# Patient Record
Sex: MALE | URBAN - METROPOLITAN AREA
[De-identification: names, ages, dates, MRNs, and addresses within clinical notes are randomized per-mention and may not be internally consistent; named-entity substitution may affect disease eponyms.]

---

## 2022-12-20 ENCOUNTER — HOSPITAL ENCOUNTER (INPATIENT)
Age: 77
LOS: 1 days | Discharge: HOME | End: 2022-12-21
Attending: STUDENT IN AN ORGANIZED HEALTH CARE EDUCATION/TRAINING PROGRAM | Admitting: INTERNAL MEDICINE
Payer: MEDICARE

## 2022-12-20 DIAGNOSIS — J84.10 GRANULOMATOUS LUNG DISEASE (HCC): Primary | ICD-10-CM

## 2022-12-20 DIAGNOSIS — J47.9 BRONCHIECTASIS WITHOUT COMPLICATION (HCC): ICD-10-CM

## 2022-12-20 DIAGNOSIS — R91.1 PULMONARY NODULE: ICD-10-CM

## 2022-12-20 PROBLEM — K22.2 ESOPHAGEAL STRICTURE: Status: ACTIVE | Noted: 2022-12-20

## 2022-12-20 LAB — GLUCOSE BLD-MCNC: 131 MG/DL (ref 75–110)

## 2022-12-20 PROCEDURE — 83036 HEMOGLOBIN GLYCOSYLATED A1C: CPT

## 2022-12-20 PROCEDURE — 82947 ASSAY GLUCOSE BLOOD QUANT: CPT

## 2022-12-20 PROCEDURE — 2580000003 HC RX 258: Performed by: INTERNAL MEDICINE

## 2022-12-20 PROCEDURE — C9113 INJ PANTOPRAZOLE SODIUM, VIA: HCPCS | Performed by: INTERNAL MEDICINE

## 2022-12-20 PROCEDURE — 84100 ASSAY OF PHOSPHORUS: CPT

## 2022-12-20 PROCEDURE — 36415 COLL VENOUS BLD VENIPUNCTURE: CPT

## 2022-12-20 PROCEDURE — 83690 ASSAY OF LIPASE: CPT

## 2022-12-20 PROCEDURE — 6360000002 HC RX W HCPCS: Performed by: INTERNAL MEDICINE

## 2022-12-20 PROCEDURE — 80053 COMPREHEN METABOLIC PANEL: CPT

## 2022-12-20 PROCEDURE — 99222 1ST HOSP IP/OBS MODERATE 55: CPT | Performed by: INTERNAL MEDICINE

## 2022-12-20 PROCEDURE — 2060000000 HC ICU INTERMEDIATE R&B

## 2022-12-20 RX ORDER — OMEPRAZOLE 20 MG/1
20 CAPSULE, DELAYED RELEASE ORAL DAILY
Status: ON HOLD | COMMUNITY
End: 2022-12-21 | Stop reason: HOSPADM

## 2022-12-20 RX ORDER — SODIUM CHLORIDE 0.9 % (FLUSH) 0.9 %
5-40 SYRINGE (ML) INJECTION EVERY 12 HOURS SCHEDULED
Status: DISCONTINUED | OUTPATIENT
Start: 2022-12-20 | End: 2022-12-21 | Stop reason: HOSPADM

## 2022-12-20 RX ORDER — ACETAMINOPHEN 650 MG/1
650 SUPPOSITORY RECTAL EVERY 6 HOURS PRN
Status: DISCONTINUED | OUTPATIENT
Start: 2022-12-20 | End: 2022-12-21 | Stop reason: HOSPADM

## 2022-12-20 RX ORDER — INSULIN LISPRO 100 [IU]/ML
0-4 INJECTION, SOLUTION INTRAVENOUS; SUBCUTANEOUS NIGHTLY
Status: DISCONTINUED | OUTPATIENT
Start: 2022-12-20 | End: 2022-12-21 | Stop reason: HOSPADM

## 2022-12-20 RX ORDER — GLYBURIDE 1.25 MG/1
1.25 TABLET ORAL
COMMUNITY

## 2022-12-20 RX ORDER — POTASSIUM CHLORIDE 7.45 MG/ML
10 INJECTION INTRAVENOUS PRN
Status: DISCONTINUED | OUTPATIENT
Start: 2022-12-20 | End: 2022-12-21 | Stop reason: HOSPADM

## 2022-12-20 RX ORDER — DEXTROSE MONOHYDRATE 100 MG/ML
INJECTION, SOLUTION INTRAVENOUS CONTINUOUS PRN
Status: DISCONTINUED | OUTPATIENT
Start: 2022-12-20 | End: 2022-12-21 | Stop reason: HOSPADM

## 2022-12-20 RX ORDER — POLYETHYLENE GLYCOL 3350 17 G/17G
17 POWDER, FOR SOLUTION ORAL DAILY PRN
Status: DISCONTINUED | OUTPATIENT
Start: 2022-12-20 | End: 2022-12-21 | Stop reason: HOSPADM

## 2022-12-20 RX ORDER — DEXTROSE, SODIUM CHLORIDE, AND POTASSIUM CHLORIDE 5; .45; .15 G/100ML; G/100ML; G/100ML
INJECTION INTRAVENOUS CONTINUOUS
Status: DISCONTINUED | OUTPATIENT
Start: 2022-12-20 | End: 2022-12-20

## 2022-12-20 RX ORDER — MAGNESIUM SULFATE 1 G/100ML
1000 INJECTION INTRAVENOUS PRN
Status: DISCONTINUED | OUTPATIENT
Start: 2022-12-20 | End: 2022-12-21 | Stop reason: HOSPADM

## 2022-12-20 RX ORDER — INSULIN LISPRO 100 [IU]/ML
0-4 INJECTION, SOLUTION INTRAVENOUS; SUBCUTANEOUS
Status: DISCONTINUED | OUTPATIENT
Start: 2022-12-21 | End: 2022-12-21 | Stop reason: HOSPADM

## 2022-12-20 RX ORDER — SODIUM CHLORIDE 0.9 % (FLUSH) 0.9 %
10 SYRINGE (ML) INJECTION PRN
Status: DISCONTINUED | OUTPATIENT
Start: 2022-12-20 | End: 2022-12-21 | Stop reason: HOSPADM

## 2022-12-20 RX ORDER — SIMVASTATIN 20 MG
10 TABLET ORAL EVERY OTHER DAY
COMMUNITY

## 2022-12-20 RX ORDER — ONDANSETRON 4 MG/1
4 TABLET, ORALLY DISINTEGRATING ORAL EVERY 8 HOURS PRN
Status: DISCONTINUED | OUTPATIENT
Start: 2022-12-20 | End: 2022-12-21 | Stop reason: HOSPADM

## 2022-12-20 RX ORDER — POTASSIUM CHLORIDE 20 MEQ/1
40 TABLET, EXTENDED RELEASE ORAL PRN
Status: DISCONTINUED | OUTPATIENT
Start: 2022-12-20 | End: 2022-12-21 | Stop reason: HOSPADM

## 2022-12-20 RX ORDER — ONDANSETRON 2 MG/ML
4 INJECTION INTRAMUSCULAR; INTRAVENOUS EVERY 6 HOURS PRN
Status: DISCONTINUED | OUTPATIENT
Start: 2022-12-20 | End: 2022-12-21 | Stop reason: HOSPADM

## 2022-12-20 RX ORDER — SODIUM CHLORIDE 9 MG/ML
INJECTION, SOLUTION INTRAVENOUS PRN
Status: DISCONTINUED | OUTPATIENT
Start: 2022-12-20 | End: 2022-12-21 | Stop reason: HOSPADM

## 2022-12-20 RX ORDER — CLOPIDOGREL BISULFATE 75 MG/1
75 TABLET ORAL EVERY OTHER DAY
COMMUNITY

## 2022-12-20 RX ORDER — ALBUTEROL SULFATE 2.5 MG/3ML
2.5 SOLUTION RESPIRATORY (INHALATION)
Status: DISCONTINUED | OUTPATIENT
Start: 2022-12-20 | End: 2022-12-21 | Stop reason: HOSPADM

## 2022-12-20 RX ORDER — ACETAMINOPHEN 325 MG/1
650 TABLET ORAL EVERY 6 HOURS PRN
Status: DISCONTINUED | OUTPATIENT
Start: 2022-12-20 | End: 2022-12-21 | Stop reason: HOSPADM

## 2022-12-20 RX ORDER — ENOXAPARIN SODIUM 100 MG/ML
40 INJECTION SUBCUTANEOUS DAILY
Status: DISCONTINUED | OUTPATIENT
Start: 2022-12-21 | End: 2022-12-21 | Stop reason: HOSPADM

## 2022-12-20 RX ADMIN — SODIUM CHLORIDE, PRESERVATIVE FREE 40 MG: 5 INJECTION INTRAVENOUS at 23:41

## 2022-12-20 NOTE — MANAGEMENT PLAN
I have not seen this patient personally. Admit orders appear in. Code status not reported in Intake note-->confirm code status [][]  Hasnt been screened for covid, unsure if needed? [][]  Meds, allergy list, pmhx, socvial hx, vitals, labs unknown at this time. Information is as  per notes/EMR        #Esophageal stricture  -Dx as per :  -Imaging/Diagnostics:?  -Patient has plans for surgical intervention as per notes ,surgery unable to perform it at [facility] and being transferred.   -unsure if this is elective vs urgent vs emergent  -Reports inability to swallow solids and liquids as per EMR note  -Hx of esophageal stricture 6 years ago as per EMR note  -Unsure if pre op clearance provided  -Npo, monitor glucose,   -MGT as per surgery      #CAD  -On asa and plavix at home as per EMR  -obtain ekg and tele monitoring  -further review if meds on hold , compliance, hgb, plt, ext.            #DM2  -unable to see home meds or labs in system  -accuchecks, hypoglycemia protocol

## 2022-12-21 ENCOUNTER — APPOINTMENT (OUTPATIENT)
Dept: GENERAL RADIOLOGY | Age: 77
End: 2022-12-21
Attending: STUDENT IN AN ORGANIZED HEALTH CARE EDUCATION/TRAINING PROGRAM
Payer: MEDICARE

## 2022-12-21 ENCOUNTER — ANESTHESIA EVENT (OUTPATIENT)
Dept: OPERATING ROOM | Age: 77
End: 2022-12-21
Payer: MEDICARE

## 2022-12-21 ENCOUNTER — APPOINTMENT (OUTPATIENT)
Dept: CT IMAGING | Age: 77
End: 2022-12-21
Attending: STUDENT IN AN ORGANIZED HEALTH CARE EDUCATION/TRAINING PROGRAM
Payer: MEDICARE

## 2022-12-21 ENCOUNTER — ANESTHESIA (OUTPATIENT)
Dept: OPERATING ROOM | Age: 77
End: 2022-12-21
Payer: MEDICARE

## 2022-12-21 VITALS
HEART RATE: 96 BPM | WEIGHT: 151.46 LBS | OXYGEN SATURATION: 95 % | SYSTOLIC BLOOD PRESSURE: 113 MMHG | RESPIRATION RATE: 26 BRPM | DIASTOLIC BLOOD PRESSURE: 62 MMHG | TEMPERATURE: 97.7 F

## 2022-12-21 PROBLEM — J47.9 BRONCHIECTASIS (HCC): Status: ACTIVE | Noted: 2022-12-21

## 2022-12-21 PROBLEM — I25.10 ATHEROSCLEROSIS OF NATIVE CORONARY ARTERY OF NATIVE HEART WITHOUT ANGINA PECTORIS: Status: ACTIVE | Noted: 2022-12-21

## 2022-12-21 PROBLEM — J84.10 GRANULOMATOUS LUNG DISEASE (HCC): Status: ACTIVE | Noted: 2022-12-21

## 2022-12-21 PROBLEM — E11.9 CONTROLLED TYPE 2 DIABETES MELLITUS WITHOUT COMPLICATION, WITHOUT LONG-TERM CURRENT USE OF INSULIN (HCC): Status: ACTIVE | Noted: 2022-12-21

## 2022-12-21 PROBLEM — D64.9 ANEMIA, NORMOCYTIC NORMOCHROMIC: Status: ACTIVE | Noted: 2022-12-21

## 2022-12-21 PROBLEM — R91.1 PULMONARY NODULE: Status: ACTIVE | Noted: 2022-12-21

## 2022-12-21 PROBLEM — Z86.16 HISTORY OF COVID-19: Status: ACTIVE | Noted: 2022-12-21

## 2022-12-21 LAB
ABO/RH: NORMAL
ABSOLUTE EOS #: 0 K/UL (ref 0–0.44)
ABSOLUTE IMMATURE GRANULOCYTE: 0 K/UL (ref 0–0.3)
ABSOLUTE LYMPH #: 0.52 K/UL (ref 1.1–3.7)
ABSOLUTE MONO #: 0.26 K/UL (ref 0.1–1.2)
ALBUMIN SERPL-MCNC: 3.5 G/DL (ref 3.5–5.2)
ALBUMIN/GLOBULIN RATIO: 1.3 (ref 1–2.5)
ALP BLD-CCNC: 97 U/L (ref 40–129)
ALT SERPL-CCNC: 17 U/L (ref 5–41)
ANION GAP SERPL CALCULATED.3IONS-SCNC: 11 MMOL/L (ref 9–17)
ANTIBODY SCREEN: NEGATIVE
ARM BAND NUMBER: NORMAL
AST SERPL-CCNC: 20 U/L
BASOPHILS # BLD: 0 % (ref 0–2)
BASOPHILS ABSOLUTE: 0 K/UL (ref 0–0.2)
BILIRUB SERPL-MCNC: 0.5 MG/DL (ref 0.3–1.2)
BUN BLDV-MCNC: 14 MG/DL (ref 8–23)
CALCIUM SERPL-MCNC: 8.9 MG/DL (ref 8.6–10.4)
CHLORIDE BLD-SCNC: 107 MMOL/L (ref 98–107)
CO2: 23 MMOL/L (ref 20–31)
CREAT SERPL-MCNC: 0.82 MG/DL (ref 0.7–1.2)
EKG ATRIAL RATE: 98 BPM
EKG P AXIS: 69 DEGREES
EKG P-R INTERVAL: 160 MS
EKG Q-T INTERVAL: 416 MS
EKG QRS DURATION: 138 MS
EKG QTC CALCULATION (BAZETT): 531 MS
EKG R AXIS: -26 DEGREES
EKG T AXIS: 121 DEGREES
EKG VENTRICULAR RATE: 98 BPM
EOSINOPHILS RELATIVE PERCENT: 0 % (ref 1–4)
ESTIMATED AVERAGE GLUCOSE: 192 MG/DL
EXPIRATION DATE: NORMAL
GFR SERPL CREATININE-BSD FRML MDRD: >60 ML/MIN/1.73M2
GLUCOSE BLD-MCNC: 124 MG/DL (ref 75–110)
GLUCOSE BLD-MCNC: 131 MG/DL (ref 75–110)
GLUCOSE BLD-MCNC: 147 MG/DL (ref 70–99)
GLUCOSE BLD-MCNC: 158 MG/DL (ref 75–110)
HBA1C MFR BLD: 8.3 % (ref 4–6)
HCT VFR BLD CALC: 32.5 % (ref 40.7–50.3)
HEMOGLOBIN: 10.8 G/DL (ref 13–17)
IMMATURE GRANULOCYTES: 0 %
LIPASE: 8 U/L (ref 13–60)
LYMPHOCYTES # BLD: 4 % (ref 24–43)
MCH RBC QN AUTO: 32.9 PG (ref 25.2–33.5)
MCHC RBC AUTO-ENTMCNC: 33.2 G/DL (ref 28.4–34.8)
MCV RBC AUTO: 99.1 FL (ref 82.6–102.9)
MONOCYTES # BLD: 2 % (ref 3–12)
MORPHOLOGY: NORMAL
NRBC AUTOMATED: 0 PER 100 WBC
PDW BLD-RTO: 13.4 % (ref 11.8–14.4)
PHOSPHORUS: 1.8 MG/DL (ref 2.5–4.5)
PLATELET # BLD: 421 K/UL (ref 138–453)
PMV BLD AUTO: 10 FL (ref 8.1–13.5)
POTASSIUM SERPL-SCNC: 4.1 MMOL/L (ref 3.7–5.3)
RBC # BLD: 3.28 M/UL (ref 4.21–5.77)
SEG NEUTROPHILS: 94 % (ref 36–65)
SEGMENTED NEUTROPHILS ABSOLUTE COUNT: 12.12 K/UL (ref 1.5–8.1)
SODIUM BLD-SCNC: 141 MMOL/L (ref 135–144)
TOTAL PROTEIN: 6.3 G/DL (ref 6.4–8.3)
TSH SERPL DL<=0.05 MIU/L-ACNC: 1.16 UIU/ML (ref 0.3–5)
WBC # BLD: 12.9 K/UL (ref 3.5–11.3)

## 2022-12-21 PROCEDURE — 86900 BLOOD TYPING SEROLOGIC ABO: CPT

## 2022-12-21 PROCEDURE — 86850 RBC ANTIBODY SCREEN: CPT

## 2022-12-21 PROCEDURE — 2580000003 HC RX 258: Performed by: CLINICAL NURSE SPECIALIST

## 2022-12-21 PROCEDURE — 7100000001 HC PACU RECOVERY - ADDTL 15 MIN: Performed by: INTERNAL MEDICINE

## 2022-12-21 PROCEDURE — 6360000002 HC RX W HCPCS: Performed by: NURSE PRACTITIONER

## 2022-12-21 PROCEDURE — 85025 COMPLETE CBC W/AUTO DIFF WBC: CPT

## 2022-12-21 PROCEDURE — 7100000000 HC PACU RECOVERY - FIRST 15 MIN: Performed by: INTERNAL MEDICINE

## 2022-12-21 PROCEDURE — 84443 ASSAY THYROID STIM HORMONE: CPT

## 2022-12-21 PROCEDURE — 82947 ASSAY GLUCOSE BLOOD QUANT: CPT

## 2022-12-21 PROCEDURE — 93005 ELECTROCARDIOGRAM TRACING: CPT | Performed by: INTERNAL MEDICINE

## 2022-12-21 PROCEDURE — C1726 CATH, BAL DIL, NON-VASCULAR: HCPCS | Performed by: INTERNAL MEDICINE

## 2022-12-21 PROCEDURE — 43249 ESOPH EGD DILATION <30 MM: CPT | Performed by: INTERNAL MEDICINE

## 2022-12-21 PROCEDURE — 94640 AIRWAY INHALATION TREATMENT: CPT

## 2022-12-21 PROCEDURE — 2580000003 HC RX 258: Performed by: INTERNAL MEDICINE

## 2022-12-21 PROCEDURE — 86901 BLOOD TYPING SEROLOGIC RH(D): CPT

## 2022-12-21 PROCEDURE — 36415 COLL VENOUS BLD VENIPUNCTURE: CPT

## 2022-12-21 PROCEDURE — 2500000003 HC RX 250 WO HCPCS: Performed by: NURSE ANESTHETIST, CERTIFIED REGISTERED

## 2022-12-21 PROCEDURE — 6360000002 HC RX W HCPCS: Performed by: NURSE ANESTHETIST, CERTIFIED REGISTERED

## 2022-12-21 PROCEDURE — 3700000000 HC ANESTHESIA ATTENDED CARE: Performed by: INTERNAL MEDICINE

## 2022-12-21 PROCEDURE — 2709999900 HC NON-CHARGEABLE SUPPLY: Performed by: INTERNAL MEDICINE

## 2022-12-21 PROCEDURE — 0D748ZZ DILATION OF ESOPHAGOGASTRIC JUNCTION, VIA NATURAL OR ARTIFICIAL OPENING ENDOSCOPIC: ICD-10-PCS | Performed by: INTERNAL MEDICINE

## 2022-12-21 PROCEDURE — 71045 X-RAY EXAM CHEST 1 VIEW: CPT

## 2022-12-21 PROCEDURE — 93010 ELECTROCARDIOGRAM REPORT: CPT | Performed by: INTERNAL MEDICINE

## 2022-12-21 PROCEDURE — 99238 HOSP IP/OBS DSCHRG MGMT 30/<: CPT | Performed by: INTERNAL MEDICINE

## 2022-12-21 PROCEDURE — 3700000001 HC ADD 15 MINUTES (ANESTHESIA): Performed by: INTERNAL MEDICINE

## 2022-12-21 PROCEDURE — 3609017700 HC EGD DILATION GASTRIC/DUODENAL STRICTURE: Performed by: INTERNAL MEDICINE

## 2022-12-21 PROCEDURE — 71250 CT THORAX DX C-: CPT

## 2022-12-21 RX ORDER — PANTOPRAZOLE SODIUM 40 MG/1
40 TABLET, DELAYED RELEASE ORAL
Qty: 30 TABLET | Refills: 5 | Status: SHIPPED | OUTPATIENT
Start: 2022-12-21

## 2022-12-21 RX ORDER — PROPOFOL 10 MG/ML
INJECTION, EMULSION INTRAVENOUS PRN
Status: DISCONTINUED | OUTPATIENT
Start: 2022-12-21 | End: 2022-12-21 | Stop reason: SDUPTHER

## 2022-12-21 RX ORDER — SODIUM CHLORIDE 9 MG/ML
INJECTION, SOLUTION INTRAVENOUS CONTINUOUS
Status: DISCONTINUED | OUTPATIENT
Start: 2022-12-21 | End: 2022-12-21

## 2022-12-21 RX ORDER — HYDRALAZINE HYDROCHLORIDE 20 MG/ML
10 INJECTION INTRAMUSCULAR; INTRAVENOUS
Status: DISCONTINUED | OUTPATIENT
Start: 2022-12-21 | End: 2022-12-21 | Stop reason: HOSPADM

## 2022-12-21 RX ORDER — AZITHROMYCIN 250 MG/1
250 TABLET, FILM COATED ORAL SEE ADMIN INSTRUCTIONS
Qty: 6 TABLET | Refills: 0 | Status: SHIPPED | OUTPATIENT
Start: 2022-12-21 | End: 2022-12-26

## 2022-12-21 RX ORDER — SIMVASTATIN 10 MG
10 TABLET ORAL EVERY OTHER DAY
Status: DISCONTINUED | OUTPATIENT
Start: 2022-12-23 | End: 2022-12-21 | Stop reason: HOSPADM

## 2022-12-21 RX ORDER — LIDOCAINE HYDROCHLORIDE 10 MG/ML
INJECTION, SOLUTION EPIDURAL; INFILTRATION; INTRACAUDAL; PERINEURAL PRN
Status: DISCONTINUED | OUTPATIENT
Start: 2022-12-21 | End: 2022-12-21 | Stop reason: SDUPTHER

## 2022-12-21 RX ORDER — SODIUM CHLORIDE 9 MG/ML
INJECTION, SOLUTION INTRAVENOUS PRN
Status: DISCONTINUED | OUTPATIENT
Start: 2022-12-21 | End: 2022-12-21 | Stop reason: HOSPADM

## 2022-12-21 RX ORDER — SODIUM CHLORIDE 0.9 % (FLUSH) 0.9 %
5-40 SYRINGE (ML) INJECTION EVERY 12 HOURS SCHEDULED
Status: DISCONTINUED | OUTPATIENT
Start: 2022-12-21 | End: 2022-12-21 | Stop reason: HOSPADM

## 2022-12-21 RX ORDER — SODIUM CHLORIDE 0.9 % (FLUSH) 0.9 %
5-40 SYRINGE (ML) INJECTION PRN
Status: DISCONTINUED | OUTPATIENT
Start: 2022-12-21 | End: 2022-12-21 | Stop reason: HOSPADM

## 2022-12-21 RX ORDER — SODIUM CHLORIDE 9 MG/ML
INJECTION, SOLUTION INTRAVENOUS CONTINUOUS
Status: DISCONTINUED | OUTPATIENT
Start: 2022-12-21 | End: 2022-12-21 | Stop reason: HOSPADM

## 2022-12-21 RX ORDER — CLOPIDOGREL BISULFATE 75 MG/1
75 TABLET ORAL EVERY OTHER DAY
Status: DISCONTINUED | OUTPATIENT
Start: 2022-12-23 | End: 2022-12-21 | Stop reason: HOSPADM

## 2022-12-21 RX ORDER — ONDANSETRON 2 MG/ML
4 INJECTION INTRAMUSCULAR; INTRAVENOUS
Status: DISCONTINUED | OUTPATIENT
Start: 2022-12-21 | End: 2022-12-21 | Stop reason: HOSPADM

## 2022-12-21 RX ADMIN — ALBUTEROL SULFATE 2.5 MG: 2.5 SOLUTION RESPIRATORY (INHALATION) at 01:56

## 2022-12-21 RX ADMIN — SODIUM CHLORIDE: 9 INJECTION, SOLUTION INTRAVENOUS at 10:50

## 2022-12-21 RX ADMIN — LIDOCAINE HYDROCHLORIDE 50 MG: 10 INJECTION, SOLUTION EPIDURAL; INFILTRATION; INTRACAUDAL; PERINEURAL at 11:12

## 2022-12-21 RX ADMIN — ENOXAPARIN SODIUM 40 MG: 100 INJECTION SUBCUTANEOUS at 08:22

## 2022-12-21 RX ADMIN — SODIUM CHLORIDE: 9 INJECTION, SOLUTION INTRAVENOUS at 00:51

## 2022-12-21 RX ADMIN — PROPOFOL 100 MG: 10 INJECTION, EMULSION INTRAVENOUS at 11:12

## 2022-12-21 RX ADMIN — SODIUM CHLORIDE, PRESERVATIVE FREE 10 ML: 5 INJECTION INTRAVENOUS at 13:01

## 2022-12-21 ASSESSMENT — ENCOUNTER SYMPTOMS
NAUSEA: 0
ABDOMINAL PAIN: 0
TROUBLE SWALLOWING: 1
COUGH: 1
SHORTNESS OF BREATH: 1
SORE THROAT: 0
VOMITING: 0

## 2022-12-21 NOTE — PROGRESS NOTES
CLINICAL PHARMACY NOTE: MEDS TO BEDS    Total # of Prescriptions Filled: 2   The following medications were delivered to the patient:  PROTONIX 40   AZITHROMYCIN 250     Additional Documentation:

## 2022-12-21 NOTE — CONSULTS
THE MEDICAL [place] [facility]  Consultation Note     . REASON FOR CONSULTATION: Progressive dysphagia      HISTORY OF PRESENT ILLNESS:     This is a 76 y.o. male who presents with dysphagia. Patient has past medical history of esophageal stricture s/p dilation 6 years ago, chronic GERD, MICHAEL on CPAP. History was taken from the patient and his wife. According to them, patient is complaining of dysphagia started around 6 years ago when he noticed that his food stuck in his chest whenever he eats. During that time, he underwent upper EGD when stricture was found and esophageal dilatation was done after which his symptoms got resolved. Since then, he has been having minimal dysphagia with solid foods intermittently but usually waits till his food goes down. According to the wife, he only wants food which is juicy/puréed in consistency so that he can digest the food easily. The patient's complaints got worse and on Monday when he was not able to eat a meal prepared by his wife. There was minimal dysphagia to liquids as well on Monday although the patient reports that now he can drink liquids comfortably. The patient was then taken to [facility] by his wife due to concerns of recurrence of previous problem. According to them, CT scan was done at the [facility] and there were concerns for stricture formation when the patient was transferred to [facility] for further GI evaluation. The patient denies any coughing episode after he eats food. There is no history of burning chest pain associated with dysphagia. Chest x-ray was done which showed chronic interstitial lung disease possibility. During bedside evaluation, the patient was awake, alert, oriented X1. He was under no acute distress. Patient was hemodynamically stable with blood pressure 124/68 mmHg and MAP of 85. He was breathing comfortably with 2 L nasal cannula oxygen.   Patient denied any headache, chest pain, abdominal pain, vomiting, nausea. He did report having shortness of breath and cough but according to the wife, it has been happening post COVID last year. Patient denied any cough episode after eating food or any episode of aspiration recently. He did not report any burning chest pain. Cardiovascular and respiratory examinations were within normal limits. The patient was being prepared to undergo CT chest without contrast for evaluation of possible stricture. Previous GI history: Esophageal stricture s/p EGD dilation 6 years ago. On PPI. PAST MEDICAL HISTORY:  No past medical history on file. No past surgical history on file. ALLERGIES:  No Known Allergies    HOME MEDICATIONS:  Prior to Admission medications    Medication Sig Start Date End Date Taking? Authorizing Provider   clopidogrel (PLAVIX) 75 MG tablet Take 75 mg by mouth every other day Half tab   Yes Historical Provider, MD   simvastatin (ZOCOR) 20 MG tablet Take 10 mg by mouth every other day   Yes Historical Provider, MD   glyBURIDE (DIABETA) 1.25 MG tablet Take 1.25 mg by mouth daily (with breakfast)   Yes Historical Provider, MD   omeprazole (PRILOSEC) 20 MG delayed release capsule Take 20 mg by mouth daily   Yes Historical Provider, MD     .  CURRENT MEDICATIONS:  Scheduled Meds:   [START ON 12/23/2022] simvastatin  10 mg Oral Every Other Day    [START ON 12/23/2022] clopidogrel  75 mg Oral Every Other Day    sodium chloride flush  5-40 mL IntraVENous 2 times per day    enoxaparin  40 mg SubCUTAneous Daily    insulin lispro  0-4 Units SubCUTAneous TID WC    insulin lispro  0-4 Units SubCUTAneous Nightly    pantoprazole (PROTONIX) 40 mg injection  40 mg IntraVENous Q12H     . Continuous Infusions:   [Held by provider] sodium chloride Stopped (12/21/22 0115)    sodium chloride      dextrose       .   PRN Meds:sodium chloride flush, sodium chloride, potassium chloride **OR** potassium alternative oral replacement **OR** potassium chloride, magnesium sulfate, ondansetron **OR** ondansetron, acetaminophen **OR** acetaminophen, polyethylene glycol, glucose, dextrose bolus **OR** dextrose bolus, glucagon (rDNA), dextrose, albuterol  . SOCIAL HISTORY:     Social History     Socioeconomic History    Marital status: Unknown     Spouse name: Not on file    Number of children: Not on file    Years of education: Not on file    Highest education level: Not on file   Occupational History    Not on file   Tobacco Use    Smoking status: Former     Types: Cigarettes     Quit date: 2012     Years since quitting: 10.9    Smokeless tobacco: Not on file   Substance and Sexual Activity    Alcohol use: Not on file    Drug use: Not on file    Sexual activity: Not on file   Other Topics Concern    Not on file   Social History Narrative    Not on file     Social Determinants of Health     Financial Resource Strain: Not on file   Food Insecurity: Not on file   Transportation Needs: Not on file   Physical Activity: Not on file   Stress: Not on file   Social Connections: Not on file   Intimate Partner Violence: Not on file   Housing Stability: Not on file       FAMILY HISTORY:   No family history on file. REVIEW OF SYSTEMS:     Constitutional: No fever, no chills, no lethargy, no weakness. HEENT:  No headache, otalgia, itchy eyes, nasal discharge or sore throat. Cardiac:  No chest pain, dyspnea, orthopnea or PND. Chest:   No cough, phlegm or wheezing. Abdomen:  No abdominal pain, nausea or vomiting. Patient has complaints of solid food dysphagia and difficulty bending down food. Neuro:  No focal weakness, abnormal movements or seizure like activity. Skin:   No rashes, no itching. :   No hematuria, no pyuria, no dysuria, no flank pain. Extremities:  No swelling or joint pains. ROS was otherwise negative except as mentioned in the [address]      PHYSICAL EXAM:    /60   Pulse 95   Temp 98.1 °F (36.7 °C) (Oral)   Resp 18   Wt 151 lb 7.3 oz (68.7 kg)   SpO2 94%   . TMAX[24]    General: Well developed, Well nourished, No apparent distress  Head:  Normocephalic, Atraumatic  EENT: EOMI, Sclera not icteric, Oropharynx moist  Neck:  Supple, Trachea midline  Lungs:CTA Bilaterally  Heart: RRR, No murmur, No rub, No gallop, PMI nondisplaced. Abdomen:Soft, Non tender, Not distended, BS WNL,  No masses. Ext:No clubbing. No cyanosis. No edema. Skin: No rashes. No jaundice. No stigmata of liver disease. Neuro:  A&O x Three, No focal neurological deficits    LABS and IMAGING:     Hemotological labs:   ANEMIA STUDIES  No results for input(s): LABIRON, TIBC, FERRITIN, [id number], FOLATE, OCCULTBLD in the last 72 hours. CBC  Recent Labs     12/21/22  0213   WBC 12.9*   HGB 10.8*   MCV 99.1   RDW 13.4          PT/INR  No results for input(s): PROTIME, INR in the last 72 hours. BMP  Recent Labs     12/20/22  2334      K 4.1      CO2 23   BUN 14   CREATININE 0.82   GLUCOSE 147*   CALCIUM 8.9       LIVER WORK UP  Hepatitis Functional Panel:  Recent Labs     12/20/22  2334   ALKPHOS 97   ALT 17   AST 20   PROT 6.3*   BILITOT 0.5   LABALBU 3.5       AMYLASE/LIPASE/AMMONIA  Recent Labs     12/20/22  2334   LIPASE 8*       Acute Hepatitis Panel   No results found for: HEPBSAG, HEPCAB, HEPBIGM, HEPAIGM    HCV Genotype   No results found for: HEPATITISCGENOTYPE    HCV Quantitative   No results found for: HCVQNT    Metabolic work up:  Ceruloplasmin  AA work up:  Alpha 1 antitrypsin   No results found for: A1A  AMA:  No results found for: MITOAB    ASMA:  No results found for: SMOOTHMUSCAB    ANCA:    ARTEMIO:  No results found for: ARTEMIO    Anti - Liver/Kidney Ab:  No results found for: LIVER-KIDNEYMICROSOMALAB    Ceruloplasmin:  No results found for: CERULOPLSM    Celiac panel:  No results found for: TISSTRNTIIGG, TTGIGA, IGA    Cancer Markers:  CEA:    No results for input(s): CEA in the last 72 hours.   Ca 125:   No results for input(s):  in the last 72 hours. Ca 19-9:     Invalid input(s):   AFP: No results for input(s): AFP in the last 72 hours. ASSESSMENT and PLAN:     Esophageal dysphagia secondary to possible esophageal stricture formation:  -Patient complains of solid food dysphagia intermittently since 6 years  -S/p EGD and dilation of esophageal stricture 6 years ago  -History of chronic GERD with inconsistent PPI use  -Increased symptoms since Monday  -CT chest without contrast ordered for further evaluation  -Will possibly need upper EGD dilation to relieve symptoms    History of chronic GERD:  -Inconsistent PPI use  -Patient counseled by primary for consistent PPI use  -Protonix 40 mg IV every 12 hours started  -Continue current management    History of CAD:  -On Plavix at home  -Did not take any home medications since 2 days given n.p.o. status  -Resume Plavix after EGD  -Management per primary    History of diabetes mellitus type 2:  -Management as per primary    MICHAEL on CPAP:  -Management as per primary    This plan was formulated in collaboration with Dr. Apolinar Pascual. Awaiting attending attestation. Electronically signed by:  Laura Perez MD  Internal Medicine Resident, PGY-1  [facility];  [place], [place]  12/21/2022,9:31 AM

## 2022-12-21 NOTE — DISCHARGE SUMMARY
[facility]  Office: [address], DO, [place], DO, Carmelo Atkinson, DO, Leonel Romel Blood, DO, Eugene Stafford MD, Stephanie Jennings MD, Guadalupe Patterson MD, Luke Holloway MD,  Alexei Rodriguez MD, Brunilda Lisa MD, Nemesio Herrera, DO, Key Stout MD,  Fiona Tavarez MD, Urszula Salas MD, Tori Moritz, DO, Aiden Bullock MD, Jenifer Mirza MD, Cecil Buerger, DO, Vinayak Barraza MD, Alecia Bernabe MD, Jax Raymond MD, Martinez Ramires MD, Luisito So, DO, Tito Faith MD, Mary Roberto MD, Rumalda Snellen, Sarina Scott, CNP, Zain Frances, CNP, Valery Garcia, CNP,  Carolina Newsome, DNP, Vicky Youngblood, CNP, Lynnette Uriarte, CNP, Yudy Jenkins, CNP, Jennifer Suero, CNP, Godwin Tay, CNP, Wayne Paz PA-C, Jaime Nichole, CNS, Kaya Lujan, CNP, Lenore Buchanan, CNP           [facility]    Discharge Summary    Patient ID: Lyly Blackburn  :  1945   MRN: [id number]     ACCOUNT:  [de-identified]   Patient's PCP: Brian Holden DO  Admit Date: 2022   Discharge Date: 2022    Discharge Physician: Aaron Guerra DO     The patient was seen and examined on day of discharge and this discharge summary is in conjunction with any daily progress note from day of discharge.     Active Discharge Diagnoses:     Primary Problem  Esophageal stricture      Hospital Problems  Active Hospital Problems    Diagnosis Date Noted    Bronchiectasis [facility]) [J47.9] 2022     Priority: Medium    Pulmonary nodule right bronchus intermedius [R91.1] 2022     Priority: Medium    Granulomatous lung disease ([facility] [address] [J84.10] 2022     Priority: Medium    History of COVID-19 [Z86.16] 2022     Priority: Medium Anemia, normocytic normochromic [D64.9] 12/21/2022     Priority: Medium    Controlled type 2 diabetes mellitus without complication, without long-term current use of insulin ([facility] [address] [E11.9] 12/21/2022     Priority: Medium    Coronary artery of native heart without angina pectoris [I25.10] 12/21/2022     Priority: Medium    Esophageal stricture [K22.2] 12/20/2022     Priority: Medium         Hospital Course:     Brief History  As documented in the medical record:  \"Kingsley Hinojosa is a 76 y.o.  ramirez with Hx of esophageal stricture s/p dilitation 6 years ago, chronic gerd with intermittent ppi compliance, nighly 02 dependence post covid infection last year, sushma on cpap, cad on plavix, HLD  who presents with progressive dysphagia over past few months. Reports feeling like food stuck in his chest. Intermittent coughing after eating. Recently noticed symptoms with liquid. Denies aspiration, Sob, pleuritic cp, angina symptoms, fever chills, diarrhea or constipation, Nausea or vomit,  leg pain or swelling, pnd or orthopnea. Patient became concerned as wife wanted him checked out, he went to outside facility who performed a CT scan and did a bedside eval and reported concern for stricture formation. Sent here for GI specialty services and further eval. \"        The intitial assessment and plan included:  \"  #Dysphagia  #Esophageal stricture  -Patient reports having a nasopharyngeal laryngoscopy done at [facility]  -Imaging/Diagnostics:?  -Patient has plans for surgical intervention as per notes ,surgery unable to perform it at [facility] facility and being transferred. -Reports inability to swallow solids and liquids without pain. Started as Solids and now liquids intermittently. No neuro deficits.   -Hx of esophageal stricture 6 years s/p dilatation  which helped significantly  -CT scan at [facility]:  -Npo, hold IVF concern for over hydration as hes been on continuous fluids for 2 days.   -currently hasnt taken his plavix or home meds in 2 days, will resume after egd  -MGT as per gi, consider egd for dx and therapeutic vs barium study     #GERD  -chronic heart burn he reports, reduces to take ppi daily. Says he only takes it when he thinks he really needs it.   -counseled on taking it as directed. While inpatient will administer Iv BID  -as per GI     #CAD  -stable no chest pain,   -On plavix at home , intermittent statin use   -obtain ekg and tele monitoring  -on plavix at home, no aspirin reported--has been off for 2 days given npo status  -hgb, plt at baseline, euvolemic, resume plavix after GI eval     #DM2  -on oral agent at home, overall controlled, denies neuropathy, has been NPO for 2 days.   -sliding scale, glucose goal 140-180 while in patient   -accuchecks, hypoglycemia protocol     #Nocturnal o2  -denies any copd, asthma hx, reports covid infection last year and DC home with o2, weaned down tro nocturnal 02. Intermittent use. Denies SOB, pleuritic cp. Reports prn albuterol and nocturnal 02 recs by his doc, they are hoping to wean off nocturnal o2 in near future  -resume NC at night     VTE ppx: on lovenox 40mg subQ            \"      The patient reports a chronic cough since COVID last year     CT scam revealed:  Perihilar predominant pleural-parenchymal abnormalities, including evidence   of prior granulomatous disease, mild cylindrical/traction bronchiectasis,   ground-glass/interstitial opacities, scattered mucus plugging, and   bronchitis/bronchiolitis. Right bronchus intermedius mural based nodule, and   some bronchial narrowing on the left/KALEB confluent opacity. Differential   includes prior infectious granulomatous diseases, granulomatous reactions to   occupational exposures, atypical sarcoidosis, other infections, connective   tissue diseases and vasculitides. Additional findings, as above. RECOMMENDATIONS:   Consider bronchoscopy. GI was consulted  EGD revealed:  Findings:    The GE junction was noted at 38 cm from the incisors. A partially obstructive Schatzki ring measuring approximately 12 to 13 mm in diameter was noted at the GE junction. A TTS -CRE balloon measuring 12 to 15 mm and then 15 to 18 mm were inserted. Sequential dilations were performed up to 16.5 mm. Post dilation inspection revealed a superficial mucosal tear at the Schatzki ring. A 3-4 cm hiatal hernia was noted without evidence of Hira lesions. Remainder of the stomach appeared normal on forward and retroflexed views. The examined duodenum appeared normal.   Recommendations:  Continue high-dose PPI such as pantoprazole or omeprazole 40 mg once daily for 8 weeks. Follow an antireflux lifestyle and GERD prevention diet. Assess response to today's dilation. If patient continues to have recurrence of symptoms, will need EGD with repeat dilation in about 3 to 4 weeks. Okay to discharge home from GI standpoint after tolerating soft diet. Okay to resume Plavix tomorrow. Patient to follow-up in GI clinic in 3-4 weeks. GI will sign off. Please call for any questions or concerns. The patient's condition was stabilized  Discharge planning initiated       Discharge plan:     Status post EGD and successful esophageal dilation  Reflux precautions  Protonix  GI eval & f/u as scheduled  Outpatient pulmonary consultation for abnormal CT  Suggest outpatient bronchoscopy  Trial of Zithromax  DVT prophylaxis  The patient's status and plan have been discussed with the patient and wife at the bedside   They are anxious for discharge  Will discharge when arrangements complete and ok with other services.   Follow-up with PCP in one week, Kathrine Mcneal DO  Notify PCP of discharge     Discharge Procedure [facility], [place], [place], Pulmonology, [place]   Referral Priority: Routine Referral Type: Eval and Treat   Referral Reason: Specialty Services Required   Referred to Provider: Hussein Nascimento Requested Specialty: Pulmonology   Number of Visits Requested: 1       Significant Diagnostic Studies:     Labs / Micro:  Labs:  Hematology:  Recent Labs     12/21/22  0213   WBC 12.9*   RBC 3.28*   HGB 10.8*   HCT 32.5*   MCV 99.1   MCH 32.9   MCHC 33.2   RDW 13.4      MPV 10.0     Chemistry:  Recent Labs     12/20/22  2334      K 4.1      CO2 23   GLUCOSE 147*   BUN 14   CREATININE 0.82   ANIONGAP 11   LABGLOM >60   CALCIUM 8.9   PHOS 1.8*     Recent Labs     12/20/22  2334 12/20/22  2339 12/21/22  0430 12/21/22  0719 12/21/22  0800 12/21/22  1355   PROT 6.3*  --   --   --   --   --    LABALBU 3.5  --   --   --   --   --    LABA1C 8.3*  --   --   --   --   --    TSH  --   --   --   --  1.16  --    AST 20  --   --   --   --   --    ALT 17  --   --   --   --   --    ALKPHOS 97  --   --   --   --   --    BILITOT 0.5  --   --   --   --   --    LIPASE 8*  --   --   --   --   --    POCGLU  --  131* 131* 158*  --  124*         Radiology:    CT CHEST WO CONTRAST    Result Date: 12/21/2022  EXAMINATION: CT OF THE CHEST WITHOUT CONTRAST 12/21/2022 8:28 am TECHNIQUE: CT of the chest was performed without the administration of intravenous contrast. Multiplanar reformatted images are provided for review. Automated exposure control, iterative reconstruction, and/or weight based adjustment of the mA/kV was utilized to reduce the radiation dose to as low as reasonably achievable. COMPARISON: Chest x-ray from the same day. HISTORY: ORDERING SYSTEM PROVIDED HISTORY: pre-op; poss interstitial lung disease TECHNOLOGIST PROVIDED HISTORY: Pre-op; poss interstitial lung disease FINDINGS: Mediastinum: Cardiac chambers WNL in size/configuration; moderate 3-vessel coronary artery calcifications. Small mostly anterior pericardial effusion. Main PA caliber WNL. No acute abnormality thoracic aorta. Heavily calcified subcarinal/left hilar nodes; no pathologically enlarged nodes. Esophageal mural prominence. Unremarkable thyroid. Lungs/pleura: Anaya/suprahilar predominant linear and ground-glass opacities extending to the pleural surface with mild cylindrical/traction bronchiectasis and mild bronchial wall thickening; mild pleural thickening also noted bilaterally. Localized KALEB confluent density identified, along the major fissure with associated traction bronchiectasis and some bronchial narrowing. Scattered irregular peripheral and pleural based densities, best seen RLL near the diaphragm with a somewhat tree-in-bud pattern. Some mucus or other debris within bronchi noted, best appreciated RLL (slice 55, series 4). No consolidation, sizable pleural effusion, pneumothorax or pulmonary edema. Tiny tracheal diverticulum superiorly on the right 4 mm bronchial nodule bronchus intermedius distally. Upper Abdomen: Multiple hepatic and splenic calcifications. Surgical absence of gallbladder. Somewhat heterogeneous hepatic attenuation. Colonic diverticula with some residual high density within large bowel. Small hiatus hernia. Soft Tissues/Bones: Mild kyphoscoliosis, osteopenia and DJD thoracic spine. No acute bony or soft tissue abnormality. Perihilar predominant pleural-parenchymal abnormalities, including evidence of prior granulomatous disease, mild cylindrical/traction bronchiectasis, ground-glass/interstitial opacities, scattered mucus plugging, and bronchitis/bronchiolitis. Right bronchus intermedius mural based endoluminal nodule (or mucous), left bronchial narrowing and KALEB confluent opacity. Differential includes prior infectious granulomatous diseases, granulomatous reactions to occupational exposures, atypical sarcoidosis, other infections, connective tissue diseases, vasculitides and other causes. Additional findings, as above. RECOMMENDATIONS: Consider bronchoscopy for further assessment and diagnosis.      XR CHEST PORTABLE    Result Date: 12/21/2022  EXAMINATION: ONE XRAY VIEW OF THE CHEST 12/21/2022 3:34 am COMPARISON: None. HISTORY: ORDERING SYSTEM PROVIDED HISTORY: pre op TECHNOLOGIST PROVIDED HISTORY: pre op Reason for Exam: upr,pre op,hx esoph stricture FINDINGS: Heart size is normal.  Pulmonary vasculature appears normal.  There is diffuse interstitial prominence and tenting of the left hemidiaphragm. There may be mild airspace opacity at the left suprahilar region. No pneumothorax or pleural effusion. Surrounding structures are unremarkable. Possible chronic interstitial lung disease. Follow-up noncontrast chest CT recommended for further evaluation preop.          Consultations:    Consults:     Final Specialist Recommendations/Findings:   IP CONSULT TO GI        Discharged Condition:    Stable     Disposition: Home    Physician Follow Up:   Noemí Rosales MD  [address]  [phone]    Schedule an appointment as soon as possible for a visit         Activity:  activity as tolerated    Diet:  diet as tolerated      Discharge Medications:      Medication List        START taking these medications      azithromycin 250 MG tablet  Commonly known as: ZITHROMAX  Take 1 tablet by mouth See Admin Instructions for 5 days 500mg on day 1 followed by 250mg on days 2 - 5     pantoprazole 40 MG tablet  Commonly known as: PROTONIX  Take 1 tablet by mouth every morning (before breakfast)            CONTINUE taking these medications      clopidogrel 75 MG tablet  Commonly known as: PLAVIX     glyBURIDE 1.25 MG tablet  Commonly known as: DIABETA     simvastatin 20 MG tablet  Commonly known as: ZOCOR            STOP taking these medications      omeprazole 20 MG delayed release capsule  Commonly known as: PRILOSEC               Where to Get Your Medications        These medications were sent to [facility] [address] [facility], [address], [facility] [address]      Phone: [phone]   azithromycin 250 MG tablet  pantoprazole 40 MG tablet Time Spent on discharge is  20 mins in patient examination, evaluation, counseling, medication reconciliation, discharge plan and follow up. Electronically signed by   Shahid Beck DO  12/21/2022  6:26 PM      Thank you Dr. Demetri Penn DO for the opportunity to be involved in this patient's care.

## 2022-12-21 NOTE — ANESTHESIA PRE PROCEDURE
Department of Anesthesiology  Preprocedure Note       Name:  Salvatore Sotomayor   Age:  76 y.o.  :  1945                                          MRN:  [id number]         Date:  2022      Surgeon: Flako Garcia):  Nai Lainez MD    Procedure: Procedure(s):  EGD ESOPHAGOGASTRODUODENOSCOPY    Medications prior to admission:   Prior to Admission medications    Medication Sig Start Date End Date Taking?  Authorizing Provider   clopidogrel (PLAVIX) 75 MG tablet Take 75 mg by mouth every other day Half tab   Yes Historical Provider, MD   simvastatin (ZOCOR) 20 MG tablet Take 10 mg by mouth every other day   Yes Historical Provider, MD   glyBURIDE (DIABETA) 1.25 MG tablet Take 1.25 mg by mouth daily (with breakfast)   Yes Historical Provider, MD   omeprazole (PRILOSEC) 20 MG delayed release capsule Take 20 mg by mouth daily   Yes Historical Provider, MD       Current medications:    Current Facility-Administered Medications   Medication Dose Route Frequency Provider Last Rate Last Admin    [Held by provider] 0.9 % sodium chloride infusion   IntraVENous Continuous NANCY Schaefer - CNS   Stopped at 22 0115    [START ON 2022] simvastatin (ZOCOR) tablet 10 mg  10 mg Oral Every Other Day Evan Sandoval MD        [START ON 2022] clopidogrel (PLAVIX) tablet 75 mg  75 mg Oral Every Other Day Evan Sandoval MD        sodium chloride flush 0.9 % injection 5-40 mL  5-40 mL IntraVENous 2 times per day NANCY Middleton - CNP        sodium chloride flush 0.9 % injection 10 mL  10 mL IntraVENous PRN NANCY Middleton - CNP        0.9 % sodium chloride infusion   IntraVENous PRN Jen Akins APRN - CNP        potassium chloride (KLOR-CON M) extended release tablet 40 mEq  40 mEq Oral PRN Jen Akins APRN - CNP        Or    potassium bicarb-citric acid (EFFER-K) effervescent tablet 40 mEq  40 mEq Oral PRN NANCY Middleton - MARIO        Or   Kisha Snell potassium chloride 10 mEq/100 mL IVPB (Peripheral Line)  10 mEq IntraVENous PRN Estanislado Betters, APRN - CNP        magnesium sulfate 1000 mg in dextrose 5% 100 mL IVPB  1,000 mg IntraVENous PRN Estanislado Betters, APRN - CNP        enoxaparin (LOVENOX) injection 40 mg  40 mg SubCUTAneous Daily Estanislado Betters, APRN - CNP   40 mg at 12/21/22 6697    ondansetron (ZOFRAN-ODT) disintegrating tablet 4 mg  4 mg Oral Q8H PRN Estanislado Betters, APRN - CNP        Or    ondansetron [facility]) injection 4 mg  4 mg IntraVENous Q6H PRN Estanislado Betters, APRN - CNP        acetaminophen (TYLENOL) tablet 650 mg  650 mg Oral Q6H PRN Estanislado Betters, APRN - CNP        Or    acetaminophen (TYLENOL) suppository 650 mg  650 mg Rectal Q6H PRN Estanislado Betters, APRN - CNP        polyethylene glycol (GLYCOLAX) packet 17 g  17 g Oral Daily PRN Estanislado Betters, APRN - CNP        glucose chewable tablet 16 g  4 tablet Oral PRN Estanislado Betters, APRN - CNP        dextrose bolus 10% 125 mL  125 mL IntraVENous PRN Estanislado Betters, APRN - CNP        Or    dextrose bolus 10% 250 mL  250 mL IntraVENous PRN Estanislado Betters, APRN - CNP        glucagon (rDNA) injection 1 mg  1 mg SubCUTAneous PRN Estanislado Betters, APRN - CNP        dextrose 10 % infusion   IntraVENous Continuous PRN Estanislado Betters, APRN - CNP        albuterol (PROVENTIL) nebulizer solution 2.5 mg  2.5 mg Nebulization As Directed RT PRN Estanislado Betters, APRN - CNP   2.5 mg at 12/21/22 0156    insulin lispro (HUMALOG) injection vial 0-4 Units  0-4 Units SubCUTAneous TID WC Estanislado Betters, APRN - CNP        insulin lispro (HUMALOG) injection vial 0-4 Units  0-4 Units SubCUTAneous Nightly Estanislado Betters, APRN - CNP        pantoprazole (PROTONIX) 40 mg in sodium chloride (PF) 0.9 % 10 mL injection  40 mg IntraVENous Q12H Hernan Ma MD   40 mg at 12/20/22 3454 Allergies:  No Known Allergies    Problem List:    Patient Active Problem List   Diagnosis Code    Esophageal stricture K22.2       Past Medical History:  No past medical history on file. Past Surgical History:  No past surgical history on file.     Social History:    Social History     Tobacco Use    Smoking status: Former     Types: Cigarettes     Quit date: 2012     Years since quitting: 10.9    Smokeless tobacco: Not on file   Substance Use Topics    Alcohol use: Not on file                                Counseling given: Not Answered      Vital Signs (Current):   Vitals:    12/21/22 0027 12/21/22 0156 12/21/22 0430 12/21/22 0600   BP: 117/61  104/60    Pulse: 95 92 95    Resp: 21 21 18    Temp: 98.3 °F (36.8 °C)  98.1 °F (36.7 °C)    TempSrc: Oral  Oral    SpO2: 98% 98% 94%    Weight:    151 lb 7.3 oz (68.7 kg)                                              BP Readings from Last 3 Encounters:   12/21/22 104/60       NPO Status:                                                                                 BMI:   Wt Readings from Last 3 Encounters:   12/21/22 151 lb 7.3 oz (68.7 kg)     There is no height or weight on file to calculate BMI.    CBC:   Lab Results   Component Value Date/Time    WBC 12.9 12/21/2022 02:13 AM    RBC 3.28 12/21/2022 02:13 AM    HGB 10.8 12/21/2022 02:13 AM    HCT 32.5 12/21/2022 02:13 AM    MCV 99.1 12/21/2022 02:13 AM    RDW 13.4 12/21/2022 02:13 AM     12/21/2022 02:13 AM       CMP:   Lab Results   Component Value Date/Time     12/20/2022 11:34 PM    K 4.1 12/20/2022 11:34 PM     12/20/2022 11:34 PM    CO2 23 12/20/2022 11:34 PM    BUN 14 12/20/2022 11:34 PM    CREATININE 0.82 12/20/2022 11:34 PM    LABGLOM >60 12/20/2022 11:34 PM    GLUCOSE 147 12/20/2022 11:34 PM    PROT 6.3 12/20/2022 11:34 PM    CALCIUM 8.9 12/20/2022 11:34 PM    BILITOT 0.5 12/20/2022 11:34 PM    ALKPHOS 97 12/20/2022 11:34 PM    AST 20 12/20/2022 11:34 PM    ALT 17 12/20/2022 11:34 PM       POC Tests:   Recent Labs     12/21/22  0719   POCGLU 158*       Coags: No results found for: PROTIME, INR, APTT    HCG (If Applicable): No results found for: PREGTESTUR, PREGSERUM, HCG, HCGQUANT     ABGs: No results found for: PHART, PO2ART, SKH3TCL, TMQ7XTR, BEART, X7KHZLXA     Type & Screen (If Applicable):  No results found for: LABABO, LABRH    Drug/Infectious Status (If Applicable):  No results found for: HIV, HEPCAB    COVID-19 Screening (If Applicable): No results found for: COVID19        Anesthesia Evaluation  Patient summary reviewed and Nursing notes reviewed no history of anesthetic complications:   Airway: Mallampati: I  TM distance: >3 FB   Neck ROM: full  Mouth opening: > = 3 FB   Dental: normal exam         Pulmonary: breath sounds clear to auscultation  (+) sleep apnea: on CPAP,                             Cardiovascular:    (+) CAD: no interval change, hyperlipidemia        Rhythm: regular  Rate: normal                    Neuro/Psych:   Negative Neuro/Psych ROS              GI/Hepatic/Renal:   (+) GERD:,          ROS comment: Esophageal stricture. Endo/Other:    (+) DiabetesType II DM, , .                 Abdominal:             Vascular: negative vascular ROS. Other Findings:           Anesthesia Plan      MAC     ASA 3       Induction: intravenous. Anesthetic plan and risks discussed with patient. Plan discussed with CRNA.                     Yo Casillas MD   12/21/2022

## 2022-12-21 NOTE — PROGRESS NOTES
[facility]  Office: [address], DO, Gideon Joe, DO, Regla Kumari, DO, [facility] Blood, DO, Carlos A Hernandez MD, Trevon Morrison MD, Gutierrez Gates MD, Renold Galeazzi, MD,  Oscar Card MD, Kristie Galdamez MD, Ken Brennan, DO, Tania Henry MD,  Roby Brambila MD, Freada Osgood, MD, Rony Penn, DO, Hernando Rubio MD, Ezequiel Starkey MD, Alban Howard, DO, Sasha Pepe MD, Francisco Westbrook MD, Gabo Nazario MD, Nikhil Fairbanks MD, Kadeem Cooper, DO, Marisela Spring MD, Riley Yates MD, Teri Hutchinson, CNP,  Lani Liu, CNP, Ting Hunter, CNP, Mechelle Lilly, CNP,  Vicenta Krueger, [facility], Dinora Chacon, CNP, Antonella Aguilar, CNP, Rand Martinez, CNP, Aylin Richardson, CNP, Genet Rogers, CNP, Shubham Mehta PACharlineC, Eusebio Starks, [facility], Jocelyne Pendleton, [facility], Purcell Bloch, [facility]    Progress Note    12/21/2022    1:37 PM    Name:   Killian Smith  MRN:     [id number]     [place]:      [de-identified]   Room:   [address] [place] [address] Day:  1  Admit Date:  12/20/2022 10:33 PM    PCP:   Tristan Barboza DO  Code Status:  Full Code    Subjective:     C/C:   Dysphagia  Esophageal stricture    Interval History Status:   Postop EGD  Feels good  Doing okay with diet  Hopes to go home    Data Base Updates:  WBC12.9 High k/uL RBC3.28 Low m/uL Gtvmvbnfpl96.8 Low      CT scan revealed:  Perihilar predominant pleural-parenchymal abnormalities, including evidence   of prior granulomatous disease, mild cylindrical/traction bronchiectasis,   ground-glass/interstitial opacities, scattered mucus plugging, and   bronchitis/bronchiolitis. Right bronchus intermedius mural based nodule, and   some bronchial narrowing on the left/KALEB confluent opacity.   Differential   includes prior infectious granulomatous diseases, granulomatous reactions to   occupational exposures, atypical sarcoidosis, other infections, connective   tissue diseases and vasculitides. Consider bronchoscopy. Brief History:     As documented in the medical record:  \"Kingsley Fajardo is a 76 y.o.  ramirez with Hx of esophageal stricture s/p dilitation 6 years ago, chronic gerd with intermittent ppi compliance, nighly 02 dependence post covid infection last year, sushma on cpap, cad on plavix, HLD  who presents with progressive dysphagia over past few months. Reports feeling like food stuck in his chest. Intermittent coughing after eating. Recently noticed symptoms with liquid. Denies aspiration, Sob, pleuritic cp, angina symptoms, fever chills, diarrhea or constipation, Nausea or vomit,  leg pain or swelling, pnd or orthopnea. Patient became concerned as wife wanted him checked out, he went to outside facility who performed a CT scan and did a bedside eval and reported concern for stricture formation. Sent here for GI specialty services and further eval. \"      The intitial assessment and plan included:  \"  #Dysphagia  #Esophageal stricture  -Patient reports having a nasopharyngeal laryngoscopy done at [facility]  -Imaging/Diagnostics:?  -Patient has plans for surgical intervention as per notes ,surgery unable to perform it at [facility] facility and being transferred. -Reports inability to swallow solids and liquids without pain. Started as Solids and now liquids intermittently. No neuro deficits.   -Hx of esophageal stricture 6 years s/p dilatation  which helped significantly  -[facility] scan at [facility]:  -Npo, hold IVF concern for over hydration as hes been on continuous fluids for 2 days. -currently hasnt taken his plavix or home meds in 2 days, will resume after egd  -MGT as per gi, consider egd for dx and therapeutic vs barium study     #GERD  -chronic heart burn he reports, reduces to take ppi daily. Says he only takes it when he thinks he really needs it.   -counseled on taking it as directed.  While inpatient will administer Iv BID  -as per GI    #CAD  -stable no chest pain,   -On plavix at home , intermittent statin use   -obtain ekg and tele monitoring  -on plavix at home, no aspirin reported--has been off for 2 days given npo status  -hgb, plt at baseline, euvolemic, resume plavix after GI eval     #DM2  -on oral agent at home, overall controlled, denies neuropathy, has been NPO for 2 days.   -sliding scale, glucose goal 140-180 while in patient   -accuchecks, hypoglycemia protocol     #Nocturnal o2  -denies any copd, asthma hx, reports covid infection last year and DC home with o2, weaned down tro nocturnal 02. Intermittent use. Denies SOB, pleuritic cp. Reports prn albuterol and nocturnal 02 recs by his doc, they are hoping to wean off nocturnal o2 in near future  -resume NC at night     VTE ppx: on lovenox 40mg subQ            \"     The patient reports a chronic cough since COVID last year    CT scam revealed:  Perihilar predominant pleural-parenchymal abnormalities, including evidence   of prior granulomatous disease, mild cylindrical/traction bronchiectasis,   ground-glass/interstitial opacities, scattered mucus plugging, and   bronchitis/bronchiolitis. Right bronchus intermedius mural based nodule, and   some bronchial narrowing on the left/KALEB confluent opacity. Differential   includes prior infectious granulomatous diseases, granulomatous reactions to   occupational exposures, atypical sarcoidosis, other infections, connective   tissue diseases and vasculitides. Additional findings, as above. RECOMMENDATIONS:   Consider bronchoscopy. GI was consulted  EGD revealed:  Findings: The GE junction was noted at 38 cm from the incisors. A partially obstructive Schatzki ring measuring approximately 12 to 13 mm in diameter was noted at the GE junction. A TTS -CRE balloon measuring 12 to 15 mm and then 15 to 18 mm were inserted. Sequential dilations were performed up to 16.5 mm.   Post dilation inspection revealed a superficial mucosal tear at the [facility] ring.  A 3-4 cm hiatal hernia was noted without evidence of Hira lesions. Remainder of the stomach appeared normal on forward and retroflexed views. The examined duodenum appeared normal.   Recommendations:  Continue high-dose PPI such as pantoprazole or omeprazole 40 mg once daily for 8 weeks. Follow an antireflux lifestyle and GERD prevention diet. Assess response to today's dilation. If patient continues to have recurrence of symptoms, will need EGD with repeat dilation in about 3 to 4 weeks. Okay to discharge home from GI standpoint after tolerating soft diet. Okay to resume Plavix tomorrow. Patient to follow-up in GI clinic in 3-4 weeks. GI will sign off. Please call for any questions or concerns. The patient's condition was stabilized  Discharge planning initiated    Past Medical History:   has no past medical history on file. Social History:   reports that he quit smoking about 10 years ago. His smoking use included cigarettes. He does not have any smokeless tobacco history on file. Family History: No family history on file. Review of Systems:     Review of Systems   Constitutional:  Positive for appetite change (Improved). HENT:  Positive for trouble swallowing (Much improved). Negative for sore throat. Respiratory:  Positive for cough (Persistent since COVID last year) and shortness of breath (Mild dyspnea on exertion). Cardiovascular:  Negative for chest pain and palpitations. Gastrointestinal:  Negative for abdominal pain, nausea and vomiting. Genitourinary:  Negative for flank pain and hematuria. Physical Examination:        Vitals  BP (!) 88/47   Pulse 92   Temp 97.2 °F (36.2 °C) (Temporal)   Resp 22   Wt 151 lb 7.3 oz (68.7 kg)   SpO2 95%   Temp (24hrs), Av.7 °F (36.5 °C), Min:96.8 °F (36 °C), Max:98.4 °F (36.9 °C)    Recent Labs     22  2339 22  0430 22  0719   POCGLU 131* 131* 158*       Physical Exam  Vitals reviewed. Constitutional:       General: He is not in acute distress. Appearance: He is not diaphoretic. HENT:      Head: Normocephalic. Nose: Nose normal.   Eyes:      General: No scleral icterus. Conjunctiva/sclera: Conjunctivae normal.   Neck:      Trachea: No tracheal deviation. Cardiovascular:      Rate and Rhythm: Normal rate and regular rhythm. Pulmonary:      Effort: Pulmonary effort is normal. No respiratory distress. Breath sounds: Normal breath sounds. No wheezing or rales. Chest:      Chest wall: No tenderness. Abdominal:      General: There is no distension. Palpations: Abdomen is soft. Tenderness: There is no abdominal tenderness. Musculoskeletal:         General: No tenderness. Cervical back: Neck supple. Skin:     General: Skin is warm and dry. Medications: Allergies:  No Known Allergies    Current Meds:   Scheduled Meds:    [START ON 12/23/2022] simvastatin  10 mg Oral Every Other Day    [START ON 12/23/2022] clopidogrel  75 mg Oral Every Other Day    sodium chloride flush  5-40 mL IntraVENous 2 times per day    enoxaparin  40 mg SubCUTAneous Daily    insulin lispro  0-4 Units SubCUTAneous TID WC    insulin lispro  0-4 Units SubCUTAneous Nightly    pantoprazole (PROTONIX) 40 mg injection  40 mg IntraVENous Q12H     Continuous Infusions:    sodium chloride      sodium chloride      dextrose       PRN Meds: sodium chloride flush, sodium chloride, potassium chloride **OR** potassium alternative oral replacement **OR** potassium chloride, magnesium sulfate, ondansetron **OR** ondansetron, acetaminophen **OR** acetaminophen, polyethylene glycol, glucose, dextrose bolus **OR** dextrose bolus, glucagon (rDNA), dextrose, albuterol    Data:     I/O (24Hr):     Intake/Output Summary (Last 24 hours) at 12/21/2022 1337  Last data filed at 12/21/2022 1126  Gross per 24 hour   Intake 300 ml   Output --   Net 300 ml       Labs:  Hematology:  Recent Labs 12/21/22  0213   WBC 12.9*   RBC 3.28*   HGB 10.8*   HCT 32.5*   MCV 99.1   MCH 32.9   MCHC 33.2   RDW 13.4      MPV 10.0     Chemistry:  Recent Labs     12/20/22  2334      K 4.1      CO2 23   GLUCOSE 147*   BUN 14   CREATININE 0.82   ANIONGAP 11   LABGLOM >60   CALCIUM 8.9   PHOS 1.8*     Recent Labs     12/20/22  2334 12/20/22  2339 12/21/22  0430 12/21/22  0719 12/21/22  0800   PROT 6.3*  --   --   --   --    LABALBU 3.5  --   --   --   --    LABA1C 8.3*  --   --   --   --    TSH  --   --   --   --  1.16   AST 20  --   --   --   --    ALT 17  --   --   --   --    ALKPHOS 97  --   --   --   --    BILITOT 0.5  --   --   --   --    LIPASE 8*  --   --   --   --    POCGLU  --  131* 131* 158*  --      ABG:No results found for: POCPH, PHART, PH, POCPCO2, OYC9OXA, PCO2, POCPO2, PO2ART, PO2, POCHCO3, KHX3BFG, HCO3, NBEA, PBEA, BEART, BE, THGBART, THB, HDU4YDH, JMAN0NGK, R4XAIKGQ, O2SAT, FIO2  No results found for: SPECIAL  No results found for: CULTURE    Radiology:  CT CHEST WO CONTRAST    Result Date: 12/21/2022  Perihilar predominant pleural-parenchymal abnormalities, including evidence of prior granulomatous disease, mild cylindrical/traction bronchiectasis, ground-glass/interstitial opacities, scattered mucus plugging, and bronchitis/bronchiolitis. Right bronchus intermedius mural based nodule, and some bronchial narrowing on the left/KALEB confluent opacity. Differential includes prior infectious granulomatous diseases, granulomatous reactions to occupational exposures, atypical sarcoidosis, other infections, connective tissue diseases and vasculitides. Additional findings, as above. RECOMMENDATIONS: Consider bronchoscopy. XR CHEST PORTABLE    Result Date: 12/21/2022  Possible chronic interstitial lung disease. Follow-up noncontrast chest CT recommended for further evaluation preop.        Assessment:        Primary Problem  Esophageal stricture    Active Hospital Problems    Diagnosis Date Noted    Bronchiectasis ([facility] [J47.9] 12/21/2022     Priority: Medium    Pulmonary nodule right bronchus intermedius [R91.1] 12/21/2022     Priority: Medium    Granulomatous lung disease ([facility] [J84.10] 12/21/2022     Priority: Medium    History of COVID-19 [Z86.16] 12/21/2022     Priority: Medium    Anemia, normocytic normochromic [D64.9] 12/21/2022     Priority: Medium    Controlled type 2 diabetes mellitus without complication, without long-term current use of insulin ([facility] [E11.9] 12/21/2022     Priority: Medium    Coronary artery of native heart without angina pectoris [I25.10] 12/21/2022     Priority: Medium    Esophageal stricture [K22.2] 12/20/2022     Priority: Medium       Plan:        Status post EGD and successful esophageal dilation  Reflux precautions  Protonix  GI eval & f/u as scheduled  Outpatient pulmonary consultation for abnormal CT  Suggest outpatient bronchoscopy  Trial of Zithromax  DVT prophylaxis  The patient's status and plan have been discussed with the patient and wife at the bedside   They are anxious for discharge  Will discharge when arrangements complete and ok with other services.   Follow-up with PCP in one week, Franchesca Wills DO  Notify PCP of discharge     [address] DO  12/21/2022  1:37 PM

## 2022-12-21 NOTE — PROGRESS NOTES
Speech Language Pathology  [address]  Speech Language Pathology    Date: 12/21/2022  Patient Name: Ag Serna  YOB: 1945   AGE: 76 y.o. MRN: [id number]        Patient Not Available for Speech Therapy     Due to:  [] Testing  [] Hemodialysis  [] Cancelled by RN  [] Surgery   [] Intubation/Sedation/Pain Medication  [] Medical instability  [x] Other: Spoke with RN, pt. Eating and drinking without difficulty. No need for  bedside swallow study at this time. Next scheduled treatment: re-consult if necessary  Completed by: Phoebe Young, SLP, M.A.  CCC-SLP

## 2022-12-21 NOTE — PROGRESS NOTES
Speech Language Pathology  [address] [facility]  Speech Language Pathology    Date: 12/21/2022  Patient Name: Sachi Rivera  YOB: 1945   AGE: 76 y.o. MRN: [id number]        Patient Not Available for Speech Therapy     Due to:  [] Testing  [] Hemodialysis  [] Cancelled by RN  [] Surgery   [] Intubation/Sedation/Pain Medication  [] Medical instability  [x] Other: RN clarified- ST ordered for swallowing.  Pt NPO at this time for procedure    Next scheduled treatment: 12/22/22  Completed by: Trenton Rodríguez, SLP, M.SSalvatore Gonzalez

## 2022-12-21 NOTE — PROGRESS NOTES
Pt discharged with proper paperwork, all questions answered, going home with wife and daughters, IV removed, belongings with pt. Taken to lobby in wheelchair.

## 2022-12-21 NOTE — ANESTHESIA POSTPROCEDURE EVALUATION
Department of Anesthesiology  Postprocedure Note    Patient: Etta Greene  MRN: [id number]  [place]: 1945  Date of evaluation: 12/21/2022      Procedure Summary     Date: 12/21/22 Room / Location: [address]    Anesthesia Start: 1106 Anesthesia Stop: 4322    Procedure: EGD DILATION BALLOON 12mm -16.5mm Diagnosis:       Esophageal stricture      (ESOPHAGEAL STRICTURE)    Surgeons: Jennifer Beckman MD Responsible Provider: Radha Roach MD    Anesthesia Type: MAC ASA Status: 3          Anesthesia Type: No value filed.     Srini Phase I:      Srini Phase II:        Anesthesia Post Evaluation    Patient location during evaluation: bedside  Patient participation: complete - patient participated  Level of consciousness: awake  Airway patency: patent  Nausea & Vomiting: no nausea and no vomiting  Complications: no  Cardiovascular status: blood pressure returned to baseline  Respiratory status: acceptable  Hydration status: euvolemic  Comments: BP (!) 98/42   Pulse 92   Temp 96.8 °F (36 °C) (Temporal)   Resp 14   Wt 151 lb 7.3 oz (68.7 kg)   SpO2 96%

## 2022-12-21 NOTE — ADDENDUM NOTE
Addendum  created 12/21/22 1142 by NANCY Foote CRNA    Intraprocedure Meds edited, Orders acknowledged in Narrator

## 2022-12-21 NOTE — OP NOTE
Operative Note      Patient: Marlen Chanel  YOB: 1945  MRN: [id number]    Date of Procedure: 12/21/2022    Pre-Op Diagnosis: ESOPHAGEAL STRICTURE    Post-Op Diagnosis:   Esophageal obstruction secondary to Schatzki ring at the GE junction dilated up to 16.5 mm,   3 cm hiatal hernia, normal stomach and duodenum       Procedure(s):  EGD DILATION BALLOON 12mm -16.5mm    Surgeon(s):  Nayana Del Cid MD    Assistant:   First Assistant: Laura Ferrer RN    Anesthesia: Monitor Anesthesia Care    Estimated Blood Loss (mL): Minimal    Complications: None    Specimens:   * No specimens in log *    Implants:  * No implants in log *      Drains: * No LDAs found *    Findings: The GE junction was noted at 38 cm from the incisors. A partially obstructive Schatzki ring measuring approximately 12 to 13 mm in diameter was noted at the GE junction. A TTS -CRE balloon measuring 12 to 15 mm and then 15 to 18 mm were inserted. Sequential dilations were performed up to 16.5 mm. Post dilation inspection revealed a superficial mucosal tear at the Schatzki ring. A 3-4 cm hiatal hernia was noted without evidence of Hira lesions. Remainder of the stomach appeared normal on forward and retroflexed views. The examined duodenum appeared normal.    Recommendations:  Continue high-dose PPI such as pantoprazole or omeprazole 40 mg once daily for 8 weeks. Follow an antireflux lifestyle and GERD prevention diet. Assess response to today's dilation. If patient continues to have recurrence of symptoms, will need EGD with repeat dilation in about 3 to 4 weeks. Okay to discharge home from GI standpoint after tolerating soft diet. Okay to resume Plavix tomorrow. Patient to follow-up in GI clinic in 3-4 weeks. GI will sign off. Please call for any questions or concerns.     Detailed Description of Procedure:   Informed consent was obtained from the patient after explanation of the procedure including indications, description of the procedure,  benefits and possible risks and complications of the procedure, and alternatives. Questions were answered. The patient's history was reviewed and a directed physical examination was performed prior to the procedure. Patient was monitored throughout the procedure with pulse oximetry and periodic assessment of vital signs. Patient was sedated as noted above. With the patient in the left lateral decubitus position, the Olympus videoendoscope was placed in the patient's mouth and under direct visualization passed into the esophagus. Visualization of the esophagus, stomach, and duodenum was performed during both introduction and withdrawal of the endoscope and retroflexed view of the proximal stomach was obtained. The scope was passed to the 2nd portion of the duodenum. The patient tolerated the procedure well and was taken to the recovery area in good condition. The patient  was taken to the recovery area in good condition.      Electronically signed by Holly Lund MD on 12/21/2022 at 11:37 AM

## 2022-12-21 NOTE — CARE COORDINATION
Case Management Assessment  Initial Evaluation    Date/Time of Evaluation: 12/21/2022 8:42 AM  Assessment Completed by: Yari Massey RN    If patient is discharged prior to next notation, then this note serves as note for discharge by case management. Patient Name: Salinas Jernigan                   YOB: 1945  Diagnosis: Esophageal stricture [K22.2]                   Date / Time: 12/20/2022 10:33 PM    Patient Admission Status: Inpatient   Readmission Risk (Low < 19, Mod (19-27), High > 27): Readmission Risk Score: 10.4    Current PCP: No primary care provider on file. PCP verified by CM? (P) Yes (Dr. Nicole Wynn)    Chart Reviewed: Yes      History Provided by: (P) Patient  Patient Orientation: (P) Alert and Oriented, Person, Place, Situation    Patient Cognition: (P) Alert    Hospitalization in the last 30 days (Readmission):  No    If yes, Readmission Assessment in CM Navigator will be completed.     Advance Directives:      Code Status: Full Code   Patient's Primary Decision Maker is: (P) Legal Next of Kin      Discharge Planning:    Patient lives with: (P) Spouse/Significant Other Type of Home: (P) House  Primary Care Giver: (P) Self  Patient Support Systems include: (P) Spouse/Significant Other, Family Members, Children   Current Financial resources: (P) Medicare  Current community resources:    Current services prior to admission: (P) None            Current DME:              Type of Home Care services:  (P) None    ADLS  Prior functional level: (P) Independent in ADLs/IADLs  Current functional level: (P) Independent in ADLs/IADLs    PT AM-PAC:   /24  OT AM-PAC:   /24    Family can provide assistance at DC: (P) Yes  Would you like Case Management to discuss the discharge plan with any other family members/significant others, and if so, who? (P) Yes ([address])  Plans to Return to Present Housing: (P) Yes  Other Identified Issues/Barriers to RETURNING to current housing: n a  Potential Assistance needed at discharge: (P) Durable Medical Equipment            Potential DME: (P) Oxygen Therapy (Comment)  Patient expects to discharge to: (P) [facility] for transportation at discharge: (P) Family    Financial    Payor: MEDICARE / Plan: MEDICARE PART A AND B / Product Type: *No Product type* /     Does insurance require precert for SNF: No    Potential assistance Purchasing Medications: (P) No  Meds-to-Beds request: Yes    No Pharmacies Listed    Notes:    Factors facilitating achievement of predicted outcomes: Family support, Motivated, Cooperative, and Pleasant    Barriers to discharge: Decreased endurance    Additional Case Management Notes: Spoke with spouse at bedside, role explained. Plan to return home independently with spouse, has transportation. Address, PCP, telephone numbers, ER contacts and insurance reviewed. Monitor for O2 needs. The Plan for Transition of Care is related to the following treatment goals of Esophageal stricture [A28.5]    IF APPLICABLE: The Patient and/or patient representative Sia Graves and his family were provided with a choice of provider and agrees with the discharge plan. Freedom of choice list with basic dialogue that supports the patient's individualized plan of care/goals and shares the quality data associated with the providers was provided to: (P) Patient   Patient Representative Name:       The Patient and/or Patient Representative Agree with the Discharge Plan?  (P) Yes    Stevie Woodson RN  Case Management Department  Ph: [phone]

## 2022-12-21 NOTE — H&P
[facility]  Office: [address], DO, Cornelia Lindsey, DO, Danielle July, DO, Marycarmen Concepcion Blood, DO, Jose Manuel Brody MD, Nahomy Lara MD, Truman Sanchez MD, Sylvie Bernheim, MD,  Estela Wallace MD, Chloé Riddle MD, Anna Navarro, DO, Anahy Rodas MD,  Maria R Mathis MD, Sienna Everett MD, Piter Lee, DO, Cherie Marino MD, Alexei Le MD, Andreea Lerma, DO, Libra Stevens MD, Ben Griffiths MD, Emily Olmstead MD, Kaden Painter MD, Casie Mejia DO, Roger Alexis MD, Gavin Jaramillo MD, Callie Llanos, CNP,  Yvonne Beauchamp, CNP, Tee Cordon, CNP, Olman Terry, CNP,  Garrick Bolanos, [facility], Rafael Teran, CNP, Kadi Lake, CNP, Damien Corral, CNP, Caro Warren, CNP, Greta Roy, [facility], Mauricio Rodriguez PA-C, Maria Isabel Cole, CNS, Alissa Johns, CNP, Nicki De Jesus, [facility]         [address] [facility]    HISTORY AND PHYSICAL EXAMINATION            Date:   12/20/2022  Patient name:  Alison Ybarra  Date of admission:  12/20/2022 10:33 PM  MRN:   [id number]  Account:  [de-identified]  YOB: 1945  PCP:    No primary care provider on file. Room:   [address] [place] [address]  Code Status:    Full Code    Chief Complaint:     Progressive dysphagia    History Obtained From:     Patient and spouse at bedside     History of Present Illness:     Alison Ybarra is a 76 y.o.  ramirez with Hx of esophageal stricture s/p dilitation 6 years ago, chronic gerd with intermittent ppi compliance, nighly 02 dependence post covid infection last year, sushma on cpap, cad on plavix, HLD  who presents with progressive dysphagia over past few months. Reports feeling like food stuck in his chest. Intermittent coughing after eating. Recently noticed symptoms with liquid. Denies aspiration, Sob, pleuritic cp, angina symptoms, fever chills, diarrhea or constipation, Nausea or vomit,  leg pain or swelling, pnd or orthopnea.   Patient became concerned as wife wanted him checked out, he went to outside facility who performed a CT scan and did a bedside eval and reported concern for stricture formation. Sent here for GI specialty services and further eval.     Patient reports he hasnt taken any of his home meds in 2 days due to NPO status while on continuous fluids. He denies any other symptoms at this time. He denies hx of bleeding, stomach ulcers or symptoms of anemia. He reports no significant weight loss but appetite reduced since covid infection last year. Patient reports he has never had a colonoscopy . Denies egd since first egd 6 years ago. Past Medical History:     No past medical history on file. Past Surgical History:     No past surgical history on file. Medications Prior to Admission:     Prior to Admission medications    Not on File        Allergies:     Patient has no allergy information on record. Social History:     Tobacco:    has no history on file for tobacco use. Alcohol:      has no history on file for alcohol use. Drug Use:  has no history on file for drug use. Family History:     No family history on file. Review of Systems:     Positive and Negative as described in HPI. ROS unrevealing other then above symptoms     Physical Exam:   There were no vitals taken for this visit. No data recorded. No results for input(s): POCGLU in the last 72 hours. No intake or output data in the 24 hours ending 12/20/22 8869    General Appearance: Ao3, speech clear, speaking in full sentences, sitting comfortably with spouse near by.    Mental status: oriented to person, place, and time  Head: normocephalic, atraumatic  Eye: no icterus, redness, pupils equal and reactive, extraocular eye movements intact, conjunctiva clear  Ear: normal external ear, no discharge, hearing intact  Nose: no drainage noted  Mouth: mucous membranes moist  Neck: supple, no carotid bruits, thyroid not palpable  Lungs: Bilateral equal air entry, clear to ausculation, no wheezing, rales or rhonchi, normal effort  Cardiovascular: normal rate, regular rhythm, no murmur, gallop, rub  Abdomen: Soft, nontender, nondistended, normal bowel sounds, no hepatomegaly or splenomegaly  Neurologic: There are no new focal motor or sensory deficits, normal muscle tone and bulk, no abnormal sensation, normal speech, cranial nerves II through XII grossly intact  Skin: No gross lesions, rashes, bruising or bleeding on exposed skin area  Extremities: peripheral pulses palpable,  trace pedal edema or calf pain with palpation  Psych: normal affect    Investigations:      Laboratory Testing:  No results found for this or any previous visit (from the past 24 hour(s)). Imaging/Diagnostics:  No results found. Assessment :      Hospital Problems             Last Modified POA    * (Principal) Esophageal stricture 12/20/2022 Yes     #Dysphagia  #Esophageal stricture  -Patient reports having a nasopharyngeal laryngoscopy done at [place]  -Imaging/Diagnostics:?  -Patient has plans for surgical intervention as per notes ,surgery unable to perform it at [facility] facility and being transferred. -Reports inability to swallow solids and liquids without pain. Started as Solids and now liquids intermittently. No neuro deficits.   -Hx of esophageal stricture 6 years s/p dilatation  which helped significantly  -CT scan at [facility]:  -Npo, hold IVF concern for over hydration as hes been on continuous fluids for 2 days. -currently hasnt taken his plavix or home meds in 2 days, will resume after egd  -MGT as per gi, consider egd for dx and therapeutic vs barium study        #GERD  -chronic heart burn he reports, reduces to take ppi daily. Says he only takes it when he thinks he really needs it.   -counseled on taking it as directed.  While inpatient will administer Iv BID  -as per GI      #CAD  -stable no chest pain,   -On plavix at home , intermittent statin use   -obtain ekg and tele monitoring  -on plavix at home, no aspirin reported--has been off for 2 days given npo status  -hgb, plt at baseline, euvolemic, resume plavix after GI eval           #DM2  -on oral agent at home, overall controlled, denies neuropathy, has been NPO for 2 days.   -sliding scale, glucose goal 140-180 while in patient   -accuchecks, hypoglycemia protocol        #Nocturnal o2  -denies any copd, asthma hx, reports covid infection last year and DC home with o2, weaned down tro nocturnal 02. Intermittent use. Denies SOB, pleuritic cp.  Reports prn albuterol and nocturnal 02 recs by his doc, they are hoping to wean off nocturnal o2 in near future  -resume NC at night      VTE ppx: on lovenox 40mg subQ

## 2022-12-22 NOTE — CARE COORDINATION
Discharge [address] [facility]  Clinical Case Management Department  Written by: Stevie Woodson RN    Patient Name: Cyndy Winter  Attending Provider: No att. providers found  Admit Date: 2022 10:33 PM  MRN: [id number]  Account: [de-identified]                     : 1945  Discharge Date: 2022      Disposition: home no needs     Stevie Woodson RN

## 2023-09-01 ENCOUNTER — HOSPITAL ENCOUNTER
Age: 78
Discharge: HOME | End: 2023-09-01
Payer: MEDICARE

## 2023-09-01 DIAGNOSIS — E11.65: Primary | ICD-10-CM

## 2023-09-01 PROCEDURE — 83036 HEMOGLOBIN GLYCOSYLATED A1C: CPT

## 2023-09-01 PROCEDURE — 36415 COLL VENOUS BLD VENIPUNCTURE: CPT

## 2023-12-19 ENCOUNTER — HOSPITAL ENCOUNTER
Dept: HOSPITAL 101 - LAB | Age: 78
Discharge: HOME | End: 2023-12-19
Payer: MEDICARE

## 2023-12-19 DIAGNOSIS — I10: ICD-10-CM

## 2023-12-19 DIAGNOSIS — I25.10: ICD-10-CM

## 2023-12-19 DIAGNOSIS — E78.01: ICD-10-CM

## 2023-12-19 DIAGNOSIS — Z12.5: ICD-10-CM

## 2023-12-19 DIAGNOSIS — E11.65: Primary | ICD-10-CM

## 2023-12-19 LAB
ADD MANUAL DIFF: NO
ANION GAP: 10.5
BLOOD UREA NITROGEN: 14 MG/DL (ref 7–18)
CALCIUM: 8.7 MG/DL (ref 8.5–10.1)
CARBON DIOXIDE: 29.2 MMOL/L (ref 21–32)
CHLORIDE: 103 MMOL/L (ref 98–107)
CHOLESTEROL: 256 MG/DL (ref ?–200)
CO2 BLD-SCNC: 29.2 MMOL/L (ref 21–32)
ESTIMATED GFR (AFRICAN AMERICA: >60 (ref 60–?)
ESTIMATED GFR (NON-AFRICAN AME: >60 (ref 60–?)
GLUCOSE BLD-MCNC: 185 MG/DL (ref 74–106)
HCT VFR BLD CALC: 38.5 % (ref 42–54)
HDL CHOLESTEROL: 42 MG/DL (ref 40–60)
HEMATOCRIT: 38.5 % (ref 42–54)
HEMOGLOBIN: 12.9 G/DL (ref 14–18)
IMMATURE GRANULOCYTES ABS AUTO: 0.01 10^3/UL (ref 0–0.03)
IMMATURE GRANULOCYTES PCT AUTO: 0.2 % (ref 0–0.5)
LYMPHOCYTES  ABSOLUTE AUTO: 1 10^3/UL (ref 1.2–3.8)
MCV RBC: 94.6 FL (ref 80–94)
MEAN CORPUSCULAR HEMOGLOBIN: 31.7 PG (ref 25.9–34)
MEAN CORPUSCULAR HGB CONC: 33.5 G/DL (ref 29.9–35.2)
MEAN CORPUSCULAR VOLUME: 94.6 FL (ref 80–94)
PLATELET # BLD: 264 10^3/UL (ref 150–450)
PLATELET COUNT: 264 10^3/UL (ref 150–450)
POTASSIUM SERPLBLD-SCNC: 3.7 MMOL/L (ref 3.5–5.1)
POTASSIUM: 3.7 MMOL/L (ref 3.5–5.1)
RED BLOOD COUNT: 4.07 10^6/UL (ref 4.7–6.1)
SODIUM BLD-SCNC: 139 MMOL/L (ref 136–145)
SODIUM: 139 MMOL/L (ref 136–145)
TRIGLYCERIDES: 156 MG/DL (ref ?–150)
VLDL CHOLESTEROL: 31.2 MG/DL
WBC # BLD: 4.5 10^3/UL (ref 4–11)
WHITE BLOOD COUNT: 4.5 10^3/UL (ref 4–11)

## 2023-12-19 PROCEDURE — 36415 COLL VENOUS BLD VENIPUNCTURE: CPT

## 2023-12-19 PROCEDURE — 80048 BASIC METABOLIC PNL TOTAL CA: CPT

## 2023-12-19 PROCEDURE — 80061 LIPID PANEL: CPT

## 2023-12-19 PROCEDURE — 85025 COMPLETE CBC W/AUTO DIFF WBC: CPT

## 2023-12-19 PROCEDURE — 83036 HEMOGLOBIN GLYCOSYLATED A1C: CPT

## 2023-12-19 PROCEDURE — 82043 UR ALBUMIN QUANTITATIVE: CPT

## 2023-12-19 PROCEDURE — G0103 PSA SCREENING: HCPCS

## 2024-04-20 ENCOUNTER — HOSPITAL ENCOUNTER
Age: 79
Discharge: HOME | End: 2024-04-20
Payer: MEDICARE

## 2024-04-20 DIAGNOSIS — E11.65: Primary | ICD-10-CM

## 2024-04-20 PROCEDURE — 36415 COLL VENOUS BLD VENIPUNCTURE: CPT

## 2024-04-20 PROCEDURE — 83036 HEMOGLOBIN GLYCOSYLATED A1C: CPT

## 2024-05-28 ENCOUNTER — HOSPITAL ENCOUNTER
Dept: HOSPITAL 101 - CT | Age: 79
Discharge: HOME | End: 2024-05-28
Payer: MEDICARE

## 2024-05-28 DIAGNOSIS — R91.1: Primary | ICD-10-CM

## 2024-05-28 PROCEDURE — 71250 CT THORAX DX C-: CPT

## 2024-12-30 ENCOUNTER — HOSPITAL ENCOUNTER
Dept: HOSPITAL 101 - LAB | Age: 79
Discharge: HOME | End: 2024-12-30
Payer: MEDICARE

## 2024-12-30 DIAGNOSIS — Z12.5: ICD-10-CM

## 2024-12-30 DIAGNOSIS — E11.65: ICD-10-CM

## 2024-12-30 DIAGNOSIS — E78.00: Primary | ICD-10-CM

## 2024-12-30 DIAGNOSIS — I25.10: ICD-10-CM

## 2024-12-30 DIAGNOSIS — I10: ICD-10-CM

## 2024-12-30 LAB
ADD MANUAL DIFF: NO
ALANINE AMINOTRANSFERASE: 30 U/L (ref 16–63)
ALBUMIN GLOBULIN RATIO: 1.1
ALBUMIN LEVEL: 3.6 G/DL (ref 3.4–5)
ALKALINE PHOSPHATASE: 109 U/L (ref 46–116)
ANION GAP: 9.2
ASPARTATE AMINO TRANSFERASE: 26 U/L (ref 15–37)
BLOOD UREA NITROGEN: 15 MG/DL (ref 7–18)
CALCIUM: 9.3 MG/DL (ref 8.5–10.1)
CARBON DIOXIDE: 30.3 MMOL/L (ref 21–32)
CHLORIDE: 107 MMOL/L (ref 98–107)
CHOLESTEROL: 180 MG/DL (ref ?–200)
CO2 BLD-SCNC: 30.3 MMOL/L (ref 21–32)
ESTIMATED GFR (AFRICAN AMERICA: >60
ESTIMATED GFR (NON-AFRICAN AME: 60
GLOBULIN: 3.4 G/DL
GLUCOSE BLD-MCNC: 151 MG/DL (ref 74–106)
HCT VFR BLD CALC: 38.8 % (ref 42–54)
HDL CHOLESTEROL: 44 MG/DL (ref 40–60)
HEMATOCRIT: 38.8 % (ref 42–54)
HEMOGLOBIN: 13.5 G/DL (ref 14–18)
IMMATURE GRANULOCYTES ABS AUTO: 0.01 10^3/UL (ref 0–0.03)
IMMATURE GRANULOCYTES PCT AUTO: 0.2 % (ref 0–0.5)
LYMPHOCYTES  ABSOLUTE AUTO: 1.1 10^3/UL (ref 1.2–3.8)
MCV RBC: 94.4 FL (ref 80–94)
MEAN CORPUSCULAR HEMOGLOBIN: 32.8 PG (ref 25.9–34)
MEAN CORPUSCULAR HGB CONC: 34.8 G/DL (ref 29.9–35.2)
MEAN CORPUSCULAR VOLUME: 94.4 FL (ref 80–94)
MICROALBUM CREATININE RATIO UR: 33.7 MG/G (ref 0–29.9)
PLATELET # BLD: 252 10^3/UL (ref 150–450)
PLATELET COUNT: 252 10^3/UL (ref 150–450)
POTASSIUM SERPLBLD-SCNC: 4.5 MMOL/L (ref 3.5–5.1)
POTASSIUM: 4.5 MMOL/L (ref 3.5–5.1)
RED BLOOD COUNT: 4.11 10^6/UL (ref 4.7–6.1)
SODIUM BLD-SCNC: 142 MMOL/L (ref 136–145)
SODIUM: 142 MMOL/L (ref 136–145)
TOTAL PROTEIN: 7 G/DL (ref 6.4–8.2)
TRIGLYCERIDES: 101 MG/DL (ref ?–150)
VLDL CHOLESTEROL: 20.2 MG/DL
WBC # BLD: 5.7 10^3/UL (ref 4–11)
WHITE BLOOD COUNT: 5.7 10^3/UL (ref 4–11)

## 2024-12-30 PROCEDURE — 80061 LIPID PANEL: CPT

## 2024-12-30 PROCEDURE — 83036 HEMOGLOBIN GLYCOSYLATED A1C: CPT

## 2024-12-30 PROCEDURE — G0103 PSA SCREENING: HCPCS

## 2024-12-30 PROCEDURE — 36415 COLL VENOUS BLD VENIPUNCTURE: CPT

## 2024-12-30 PROCEDURE — 85025 COMPLETE CBC W/AUTO DIFF WBC: CPT

## 2024-12-30 PROCEDURE — 82570 ASSAY OF URINE CREATININE: CPT

## 2024-12-30 PROCEDURE — 80053 COMPREHEN METABOLIC PANEL: CPT

## 2024-12-30 PROCEDURE — 82043 UR ALBUMIN QUANTITATIVE: CPT

## 2025-04-22 ENCOUNTER — HOSPITAL ENCOUNTER
Dept: HOSPITAL 101 - LAB | Age: 80
Discharge: HOME | End: 2025-04-22
Payer: MEDICARE

## 2025-04-22 DIAGNOSIS — E11.65: Primary | ICD-10-CM

## 2025-04-22 DIAGNOSIS — D64.9: ICD-10-CM

## 2025-04-22 LAB
ADD MANUAL DIFF: NO
HEMOGLOBIN: 12.7 G/DL (ref 14–18)
IMMATURE GRANULOCYTES ABS AUTO: 0.01 10^3/UL (ref 0–0.03)
IMMATURE GRANULOCYTES PCT AUTO: 0.2 % (ref 0–0.5)
MCH RBC QN AUTO: 32.2 PG (ref 25.9–34)
MCV RBC: 93.9 FL (ref 80–94)
MEAN CORPUSCULAR HGB CONC: 34.3 G/DL (ref 29.9–35.2)
PLATELET # BLD: 252 10^3/UL (ref 150–450)
RBC # BLD AUTO: 3.94 10^6/UL (ref 4.7–6.1)
WBC # BLD: 5.3 10^3/UL (ref 4–11)

## 2025-04-22 PROCEDURE — 36415 COLL VENOUS BLD VENIPUNCTURE: CPT

## 2025-04-22 PROCEDURE — 85025 COMPLETE CBC W/AUTO DIFF WBC: CPT

## 2025-04-22 PROCEDURE — 83036 HEMOGLOBIN GLYCOSYLATED A1C: CPT

## (undated) DEVICE — SYRINGE INFL 60ML DISP ALLIANCE II

## (undated) DEVICE — CATHETER DIL 6FR L180CM BLLN INFLATED 36-40.5-45FR L8CM ES